# Patient Record
Sex: MALE | Race: WHITE | Employment: OTHER | URBAN - METROPOLITAN AREA
[De-identification: names, ages, dates, MRNs, and addresses within clinical notes are randomized per-mention and may not be internally consistent; named-entity substitution may affect disease eponyms.]

---

## 2020-07-16 PROBLEM — M54.6 CHRONIC MIDLINE THORACIC BACK PAIN: Status: ACTIVE | Noted: 2017-01-06

## 2020-07-16 PROBLEM — K29.50 CHRONIC GASTRITIS: Status: ACTIVE | Noted: 2017-12-16

## 2020-07-16 PROBLEM — F10.10 ALCOHOL ABUSE: Status: ACTIVE | Noted: 2018-11-23

## 2020-07-16 PROBLEM — G47.00 INSOMNIA: Status: ACTIVE | Noted: 2017-01-06

## 2020-07-16 PROBLEM — I10 ESSENTIAL HYPERTENSION: Status: ACTIVE | Noted: 2017-01-06

## 2020-07-16 PROBLEM — L40.50 PSORIASIS WITH ARTHROPATHY (HCC): Status: ACTIVE | Noted: 2017-01-06

## 2020-07-16 PROBLEM — F10.11 HISTORY OF ALCOHOL ABUSE: Status: ACTIVE | Noted: 2018-11-23

## 2020-07-16 PROBLEM — G89.29 CHRONIC MIDLINE THORACIC BACK PAIN: Status: ACTIVE | Noted: 2017-01-06

## 2020-07-16 PROBLEM — G60.9 IDIOPATHIC PERIPHERAL NEUROPATHY: Status: ACTIVE | Noted: 2018-11-23

## 2020-07-16 PROBLEM — E03.9 HYPOACTIVE THYROID: Status: ACTIVE | Noted: 2017-01-06

## 2020-07-16 PROBLEM — M10.9 GOUT: Status: ACTIVE | Noted: 2017-01-06

## 2020-07-16 PROBLEM — F41.8 DEPRESSION WITH ANXIETY: Status: ACTIVE | Noted: 2017-01-06

## 2020-07-16 PROBLEM — N52.9 ED (ERECTILE DYSFUNCTION) OF ORGANIC ORIGIN: Status: ACTIVE | Noted: 2017-01-06

## 2020-07-25 ENCOUNTER — HOSPITAL ENCOUNTER (OUTPATIENT)
Dept: ULTRASOUND IMAGING | Age: 65
Discharge: HOME | End: 2020-07-25
Attending: FAMILY MEDICINE
Payer: MEDICARE

## 2020-07-25 DIAGNOSIS — L72.0 CYST OF SKIN AND SUBCUTANEOUS TISSUE: ICD-10-CM

## 2020-07-25 PROCEDURE — 76536 US EXAM OF HEAD AND NECK: CPT

## 2020-07-27 NOTE — PROGRESS NOTES
Please advise patient that his ultrasound was not able to adequately visualize his soft tissue mass. There was some vascular abnormalities which need to be further examined with CT scan. This is been ordered and patient should be contacted to set up imaging.

## 2020-07-31 NOTE — PROGRESS NOTES
Patient called again to discuss results. Was able to speak with him and explained that he needed a CT.

## 2020-08-04 NOTE — PROGRESS NOTES
Spoke with Oumou at [facility] radiology and she will contact pt to schedule. She doesn't know why it wasn't scheduled.

## 2020-08-04 NOTE — PROGRESS NOTES
I called and spoke to the patient about the results but I do not see any appointment for the CT scan.   Can you please follow-up on this and see if he has been contacted to schedule this and what the delay might be?

## 2020-08-11 ENCOUNTER — HOSPITAL ENCOUNTER (OUTPATIENT)
Dept: CT IMAGING | Age: 65
Discharge: HOME | End: 2020-08-11
Attending: FAMILY MEDICINE
Payer: MEDICARE

## 2020-08-11 DIAGNOSIS — R22.1 NECK MASS: ICD-10-CM

## 2020-08-11 DIAGNOSIS — R93.89 ABNORMAL FINDING ON ULTRASOUND: ICD-10-CM

## 2020-08-11 LAB — CREAT BLD-MCNC: 0.9 MG/DL (ref 0.8–1.5)

## 2020-08-11 PROCEDURE — 74011636320 HC RX REV CODE- 636/320: Performed by: FAMILY MEDICINE

## 2020-08-11 PROCEDURE — 70491 CT SOFT TISSUE NECK W/DYE: CPT

## 2020-08-11 PROCEDURE — 82565 ASSAY OF CREATININE: CPT

## 2020-08-11 PROCEDURE — 74011000258 HC RX REV CODE- 258: Performed by: FAMILY MEDICINE

## 2020-08-11 RX ORDER — SODIUM CHLORIDE 0.9 % (FLUSH) 0.9 %
10 SYRINGE (ML) INJECTION
Status: COMPLETED | OUTPATIENT
Start: 2020-08-11 | End: 2020-08-11

## 2020-08-11 RX ADMIN — IOPAMIDOL 80 ML: 755 INJECTION, SOLUTION INTRAVENOUS at 07:53

## 2020-08-11 RX ADMIN — SODIUM CHLORIDE 100 ML: 900 INJECTION, SOLUTION INTRAVENOUS at 07:53

## 2020-08-11 RX ADMIN — Medication 10 ML: at 07:53

## 2020-11-19 PROBLEM — S06.5XAA SUBDURAL HEMATOMA: Status: ACTIVE | Noted: 2020-09-07

## 2020-11-19 PROBLEM — G44.319 ACUTE POST-TRAUMATIC HEADACHE, NOT INTRACTABLE: Status: ACTIVE | Noted: 2020-10-19

## 2020-11-19 PROBLEM — Z98.890 S/P BRAIN SURGERY: Status: ACTIVE | Noted: 2020-10-21

## 2020-11-27 PROBLEM — F10.21 H/O ALCOHOL DEPENDENCE (HCC): Status: ACTIVE | Noted: 2018-11-23
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
## 2021-03-12 PROBLEM — R35.1 NOCTURIA: Status: ACTIVE | Noted: 2020-10-24

## 2021-03-12 PROBLEM — Z98.1 ARTHRODESIS STATUS: Status: ACTIVE | Noted: 2018-11-23

## 2021-03-12 PROBLEM — R53.1 GENERALIZED WEAKNESS: Status: ACTIVE | Noted: 2020-12-12

## 2021-03-12 PROBLEM — R41.89 IMPAIRED COGNITION: Status: ACTIVE | Noted: 2020-10-19

## 2021-03-12 PROBLEM — Z78.9 IMPAIRED MOBILITY AND ADLS: Status: ACTIVE | Noted: 2020-10-19

## 2021-03-12 PROBLEM — E83.52 HYPERCALCEMIA: Status: ACTIVE | Noted: 2020-12-12

## 2021-03-12 PROBLEM — F43.25 ADJUSTMENT DISORDER WITH MIXED DISTURBANCE OF EMOTIONS AND CONDUCT: Status: ACTIVE | Noted: 2020-10-19

## 2021-03-12 PROBLEM — Z09 POSTOP CHECK: Status: ACTIVE | Noted: 2020-11-16

## 2021-03-12 PROBLEM — R13.12 OROPHARYNGEAL DYSPHAGIA: Status: ACTIVE | Noted: 2020-10-19

## 2021-03-12 PROBLEM — J95.821 ACUTE RESPIRATORY FAILURE FOLLOWING TRAUMA AND SURGERY (HCC): Status: ACTIVE | Noted: 2020-10-19

## 2021-03-12 PROBLEM — D62 ACUTE BLOOD LOSS ANEMIA: Status: ACTIVE | Noted: 2020-10-19

## 2021-03-12 PROBLEM — Z74.09 IMPAIRED MOBILITY AND ADLS: Status: ACTIVE | Noted: 2020-10-19

## 2021-03-12 PROBLEM — E83.42 HYPOMAGNESEMIA: Status: ACTIVE | Noted: 2020-10-20

## 2021-03-12 PROBLEM — F10.10 ALCOHOL ABUSE: Status: ACTIVE | Noted: 2020-10-19

## 2021-03-12 PROBLEM — R35.0 INCREASED FREQUENCY OF URINATION: Status: ACTIVE | Noted: 2020-10-26

## 2021-03-12 PROBLEM — R30.0 DYSURIA: Status: ACTIVE | Noted: 2020-10-20

## 2021-03-12 PROBLEM — R60.0 BILATERAL LOWER EXTREMITY EDEMA: Status: ACTIVE | Noted: 2020-12-12

## 2021-06-09 PROBLEM — F32.0 CURRENT MILD EPISODE OF MAJOR DEPRESSIVE DISORDER (HCC): Status: ACTIVE | Noted: 2021-06-09

## 2021-07-02 ENCOUNTER — HOSPITAL ENCOUNTER (OUTPATIENT)
Dept: GENERAL RADIOLOGY | Age: 66
Discharge: HOME | End: 2021-07-02
Payer: MEDICARE

## 2021-07-02 ENCOUNTER — TRANSCRIBE ORDER (OUTPATIENT)
Dept: REGISTRATION | Age: 66
End: 2021-07-02

## 2021-07-02 DIAGNOSIS — M47.9 SPONDYLOSIS: ICD-10-CM

## 2021-07-02 DIAGNOSIS — M47.9 SPONDYLOSIS: Primary | ICD-10-CM

## 2021-07-02 PROCEDURE — 72072 X-RAY EXAM THORAC SPINE 3VWS: CPT

## 2021-07-02 PROCEDURE — 72114 X-RAY EXAM L-S SPINE BENDING: CPT
